# Patient Record
Sex: FEMALE | Race: ASIAN | URBAN - METROPOLITAN AREA
[De-identification: names, ages, dates, MRNs, and addresses within clinical notes are randomized per-mention and may not be internally consistent; named-entity substitution may affect disease eponyms.]

---

## 2017-03-14 ENCOUNTER — TELEPHONE (OUTPATIENT)
Dept: FAMILY MEDICINE | Facility: CLINIC | Age: 72
End: 2017-03-14

## 2017-03-14 NOTE — LETTER
[address] [address]  [phone]  Dept: [phone]      March 14, 2017      Kwesi Francois  [address] [address] [place] [id number]    Dear Kwesi Francois,     At [facility] we care about your health and are committed to providing quality patient care.    Which includes staying current on preventive cancer screenings.  You can increase your chances of finding and treating cancers through regular screenings.      Our records indicate you may be due for the following preventive screening(s):    Colonoscopy    Colonoscopy is recommended every ten years for everyone age 50 and older. Please take a moment to read over the enclosed information packet about colon cancer screening. We strongly urge our patient's to consider having a colonoscopy done, which is the best screening test available and only needs to be done every 10 years if normal. If you are unwilling or unable to have a colonoscopy then we recommend the annual stool testing for blood. This test is called a FIT test and it looks for blood in the stool.     To schedule an appointment for your colonoscopy, please see the attached referral.     Mammogram    Mammogram for breast cancer   Recommended every 1-2 years for women age 50 and older  Mammograms help detect breast cancer, which is the most common cancer among women in the [place].  You may need to start having mammograms earlier and more often if you have had breast cancer, breast problems, or a family history of breast cancer.     To schedule an appointment or discuss this screening further, you may contact us by phone at the [facility] at [phone] or online through the patient [email or web address] @ [email or web address]    If you have had any of the screenings listed above at another facility, please call us so that we may update your chart.      Your partners in health,      Quality  Committee at [facility]